# Patient Record
Sex: MALE | Race: WHITE
[De-identification: names, ages, dates, MRNs, and addresses within clinical notes are randomized per-mention and may not be internally consistent; named-entity substitution may affect disease eponyms.]

---

## 2024-04-02 ENCOUNTER — APPOINTMENT (OUTPATIENT)
Dept: ORTHOPEDIC SURGERY | Facility: CLINIC | Age: 54
End: 2024-04-02
Payer: MEDICARE

## 2024-04-02 VITALS — WEIGHT: 290 LBS | HEIGHT: 75 IN | BODY MASS INDEX: 36.06 KG/M2

## 2024-04-02 DIAGNOSIS — Z78.9 OTHER SPECIFIED HEALTH STATUS: ICD-10-CM

## 2024-04-02 DIAGNOSIS — M75.21 BICIPITAL TENDINITIS, RIGHT SHOULDER: ICD-10-CM

## 2024-04-02 DIAGNOSIS — E11.9 TYPE 2 DIABETES MELLITUS W/OUT COMPLICATIONS: ICD-10-CM

## 2024-04-02 PROCEDURE — 20611 DRAIN/INJ JOINT/BURSA W/US: CPT | Mod: RT

## 2024-04-02 PROCEDURE — 76882 US LMTD JT/FCL EVL NVASC XTR: CPT | Mod: 59

## 2024-04-02 PROCEDURE — 73030 X-RAY EXAM OF SHOULDER: CPT | Mod: RT

## 2024-04-02 PROCEDURE — 99204 OFFICE O/P NEW MOD 45 MIN: CPT | Mod: 25

## 2024-04-02 PROCEDURE — 73010 X-RAY EXAM OF SHOULDER BLADE: CPT | Mod: RT

## 2024-04-02 PROCEDURE — J3490M: CUSTOM

## 2024-04-02 RX ORDER — ATORVASTATIN CALCIUM 80 MG/1
TABLET, FILM COATED ORAL
Refills: 0 | Status: ACTIVE | COMMUNITY

## 2024-04-02 RX ORDER — LOSARTAN POTASSIUM 100 MG/1
TABLET, FILM COATED ORAL
Refills: 0 | Status: ACTIVE | COMMUNITY

## 2024-04-02 NOTE — HISTORY OF PRESENT ILLNESS
[Sharp] : sharp [Constant] : constant [Retired] : Work status: retired [de-identified] : 4/2/24: 52yo male presenting with RT shoulder pain for one month. Believes it may be from working out lightly each day. Experiencing constant sharp pain. No prior treatment.   PMH: HTN, DM, HLD  [] : no [FreeTextEntry1] : RT shoulder  [FreeTextEntry5] : 54yo male presenting with RT shoulder pain for one month. Believes it may be from working out lightly each day. Experiencing constant sharp pain. No prior treatment.

## 2024-04-02 NOTE — PROCEDURE
[FreeTextEntry3] :  Large joint injection was performed of the right shoulder. An injection of Lidocaine 3cc of 1% , Bupivacaine (Marcaine) 6cc of 0.5% , Triamcinolone (Kenalog) 2cc of 40 mg was used. Patient was advised to call if redness, pain or fever occur and apply ice for 15 minutes out of every hour for the next 12-24 hours as tolerated.  Patient has tried OTC's including aspirin, Ibuprofen, Aleve, etc or prescription NSAIDS, and/or exercises at home and/or physical therapy without satisfactory response, patient had decreased mobility in the joint and the risks benefits, and alternatives have been discussed, and verbal consent was obtained.  The site was prepped with alcohol, betadine and ethyl chloride sprayed topically  The risks, benefits and contents of the injection have been discussed. Risks include but are not limited to allergic reaction, flare reaction, permanent white skin discoloration at the injection site and infection. The patient understands the risks and agrees to having the injection. All questions have been answered.  Ultrasound guidance was indicated for this patient due to prior failure or difficult injection. All ultrasound images have been permanently captured and stored accordingly in our picture archiving and communication system. Visualization of the needle and placement of injection was performed without complication. An ultrasound of the extremity was indicated due to evaluate tendon for tears, tendonitis, soft tissue mass, or ganglion cyst. The findings showed no evidence of ligament, tendon or muscle tear.

## 2024-04-02 NOTE — IMAGING
[Right] : right shoulder [Type 2 acromion] : Type 2 acromion [There are no fractures, subluxations or dislocations. No significant abnormalities are seen] : There are no fractures, subluxations or dislocations. No significant abnormalities are seen

## 2024-04-02 NOTE — DISCUSSION/SUMMARY
[de-identified] : 52yo M with right biceps tendonitis  1) CSI right shoulder - tolerated well  2) start PT  3) rtc 6 weeks, if no improvement, will obtain MRI

## 2024-04-02 NOTE — PHYSICAL EXAM
[Right] : right shoulder [Sitting] : sitting [Mild] : mild [5 ___] : forward flexion 5[unfilled]/5 [5___] : internal rotation 5[unfilled]/5 [] : no ecchymosis [TWNoteComboBox7] : active forward flexion 160 degrees [TWNoteComboBox6] : internal rotation 60 degrees [de-identified] : external rotation 80 degrees

## 2024-05-14 ENCOUNTER — APPOINTMENT (OUTPATIENT)
Dept: ORTHOPEDIC SURGERY | Facility: CLINIC | Age: 54
End: 2024-05-14
Payer: MEDICARE

## 2024-05-14 VITALS — HEIGHT: 75 IN | BODY MASS INDEX: 35.43 KG/M2 | WEIGHT: 285 LBS

## 2024-05-14 VITALS — BODY MASS INDEX: 36.06 KG/M2 | HEIGHT: 75 IN | WEIGHT: 290 LBS

## 2024-05-14 PROCEDURE — 73564 X-RAY EXAM KNEE 4 OR MORE: CPT | Mod: RT

## 2024-05-14 PROCEDURE — J3490M: CUSTOM

## 2024-05-14 PROCEDURE — 20611 DRAIN/INJ JOINT/BURSA W/US: CPT

## 2024-05-14 PROCEDURE — 99214 OFFICE O/P EST MOD 30 MIN: CPT | Mod: 25

## 2024-05-14 NOTE — HISTORY OF PRESENT ILLNESS
[Sharp] : sharp [Constant] : constant [Retired] : Work status: retired [de-identified] : 5/14/24: PT here for R knee pain.  States had meniscectomy in 2007. States did round of synvisc in 2010 with mild relief.  Reports constant pain in knee with all activities, no edema, and limited ROM.  4/2/24: 52yo male presenting with RT shoulder pain for one month. Believes it may be from working out lightly each day. Experiencing constant sharp pain. No prior treatment.   PMH: HTN, DM, HLD  [] : no [FreeTextEntry1] : RT shoulder  [FreeTextEntry5] : CSI right shoulder last visit. has started PT.

## 2024-05-14 NOTE — PHYSICAL EXAM
[Right] : right knee [NL (0)] : extension 0 degrees [5___] : hamstring 5[unfilled]/5 [Negative] : negative Delphine's [] : no pain with varus stress [TWNoteComboBox7] : flexion 130 degrees

## 2024-05-14 NOTE — DISCUSSION/SUMMARY
[de-identified] : 52yo M with right knee OA. hx of right knee LMT in 2007.  1) CSI right knee today - tolerated well  2) start PT  3) discussed availability of visco injections and pt would like to proceed. will request auth for a series of injections. 4) rtc once auth is approved for injections

## 2024-05-14 NOTE — IMAGING
[Type 2 acromion] : Type 2 acromion [There are no fractures, subluxations or dislocations. No significant abnormalities are seen] : There are no fractures, subluxations or dislocations. No significant abnormalities are seen [Right] : right knee [All Views] : anteroposterior, lateral, skyline, and anteroposterior standing [Mild tricompartmental OA lateral narrowing] : Mild tricompartmental OA lateral narrowing [Moderate tricompartmental OA medial narrowing] : Moderate tricompartmental OA medial narrowing [Mild patellofemoral OA] : Mild patellofemoral OA

## 2024-05-16 RX ORDER — HYALURONATE SODIUM 20 MG/2 ML
20 SYRINGE (ML) INTRAARTICULAR
Qty: 3 | Refills: 0 | Status: ACTIVE | COMMUNITY
Start: 2024-05-16 | End: 1900-01-01

## 2024-05-30 ENCOUNTER — APPOINTMENT (OUTPATIENT)
Dept: ORTHOPEDIC SURGERY | Facility: CLINIC | Age: 54
End: 2024-05-30

## 2024-06-06 ENCOUNTER — APPOINTMENT (OUTPATIENT)
Dept: ORTHOPEDIC SURGERY | Facility: CLINIC | Age: 54
End: 2024-06-06
Payer: MEDICARE

## 2024-06-06 VITALS — WEIGHT: 285 LBS | BODY MASS INDEX: 35.43 KG/M2 | HEIGHT: 75 IN

## 2024-06-06 PROCEDURE — 20611 DRAIN/INJ JOINT/BURSA W/US: CPT | Mod: RT

## 2024-06-06 PROCEDURE — 99213 OFFICE O/P EST LOW 20 MIN: CPT | Mod: 25

## 2024-06-06 NOTE — HISTORY OF PRESENT ILLNESS
[Sharp] : sharp [Constant] : constant [Retired] : Work status: retired [de-identified] : 6/6/24: here to f/up R knee, reports continued R knee pain.   5/14/24: PT here for R knee pain.  States had meniscectomy in 2007. States did round of synvisc in 2010 with mild relief.  Reports constant pain in knee with all activities, no edema, and limited ROM.  4/2/24: 52yo male presenting with RT shoulder pain for one month. Believes it may be from working out lightly each day. Experiencing constant sharp pain. No prior treatment.   PMH: HTN, DM, HLD  [] : no [FreeTextEntry1] : RT knee  [FreeTextEntry5] : Euflexxa #1 RT knee.

## 2024-06-06 NOTE — IMAGING
[Right] : right knee [All Views] : anteroposterior, lateral, skyline, and anteroposterior standing [Mild tricompartmental OA lateral narrowing] : Mild tricompartmental OA lateral narrowing [Mild patellofemoral OA] : Mild patellofemoral OA [Moderate tricompartmental OA medial narrowing] : Moderate tricompartmental OA medial narrowing

## 2024-06-06 NOTE — DISCUSSION/SUMMARY
[de-identified] : 53m with R knee djd. discussed availability of visco injections and pt would like to proceed. Euflexxa #1 Injection tolerated well. Post injection instructions reviewed. 1) wbat, cryotherapy 2) rtc 1 week  Entered by Ewa Jenkins acting as scribe. Dr. Carballo- The documentation recorded by the scribe accurately reflects the service I personally performed and the decisions made by me.

## 2024-06-06 NOTE — PROCEDURE
[FreeTextEntry3] : Large joint injection was performed  of the right knee. The indication for this procedure was x-ray evidence of Osteoarthritis on this or prior visit. The site was prepped with alcohol and betadine. An injection of Lidocaine 3cc of 1% , Euflexxa 20mg, # 1 was used.   Patient was advised to call if redness, pain or fever occur and apply ice for 15 minutes out of every hour for the next 12-24 hours as tolerated.   Patient has tried OTC's including aspirin, Ibuprofen, Aleve, etc or prescription NSAIDS, and/or exercises at home and/or physical therapy without satisfactory response, patient had decreased mobility in the joint and the risks benefits, and alternatives have been discussed, and verbal consent was obtained.   The risks, benefits and contents of the injection have been discussed.  Risks include but are not limited to allergic reaction, flare reaction, permanent white skin discoloration at the injection site and infection.  The patient understands the risks and agrees to having the injection.  All questions have been answered.   Ultrasound guidance was indicated for this patient due to prior failure or difficult injection.

## 2024-06-13 ENCOUNTER — APPOINTMENT (OUTPATIENT)
Dept: ORTHOPEDIC SURGERY | Facility: CLINIC | Age: 54
End: 2024-06-13
Payer: MEDICARE

## 2024-06-13 PROCEDURE — 20611 DRAIN/INJ JOINT/BURSA W/US: CPT | Mod: RT

## 2024-06-13 NOTE — DISCUSSION/SUMMARY
[de-identified] : 53m with R knee djd. discussed availability of visco injections and pt would like to proceed. Euflexxa #2 Injection tolerated well. Post injection instructions reviewed. 1) wbat, cryotherapy 2) rtc 1 week  Entered by Ewa Jenkins acting as scribe. Dr. Carballo- The documentation recorded by the scribe accurately reflects the service I personally performed and the decisions made by me.

## 2024-06-13 NOTE — HISTORY OF PRESENT ILLNESS
[Sharp] : sharp [Constant] : constant [Retired] : Work status: retired [de-identified] : 6/13/24: here to f/up R knee and euflexxa #2 6/6/24: here to f/up R knee, reports continued R knee pain.   5/14/24: PT here for R knee pain.  States had meniscectomy in 2007. States did round of synvisc in 2010 with mild relief.  Reports constant pain in knee with all activities, no edema, and limited ROM.  4/2/24: 52yo male presenting with RT shoulder pain for one month. Believes it may be from working out lightly each day. Experiencing constant sharp pain. No prior treatment.   PMH: HTN, DM, HLD  [] : no [FreeTextEntry1] : RT knee  [FreeTextEntry5] : Euflexxa #1 RT knee.

## 2024-06-13 NOTE — PROCEDURE
[FreeTextEntry3] : Large joint injection was performed  of the right knee. The indication for this procedure was x-ray evidence of Osteoarthritis on this or prior visit. The site was prepped with alcohol and betadine. An injection of Lidocaine 3cc of 1% , Euflexxa 20mg, # 2 was used.   Patient was advised to call if redness, pain or fever occur and apply ice for 15 minutes out of every hour for the next 12-24 hours as tolerated.   Patient has tried OTC's including aspirin, Ibuprofen, Aleve, etc or prescription NSAIDS, and/or exercises at home and/or physical therapy without satisfactory response, patient had decreased mobility in the joint and the risks benefits, and alternatives have been discussed, and verbal consent was obtained.   The risks, benefits and contents of the injection have been discussed.  Risks include but are not limited to allergic reaction, flare reaction, permanent white skin discoloration at the injection site and infection.  The patient understands the risks and agrees to having the injection.  All questions have been answered.   Ultrasound guidance was indicated for this patient due to prior failure or difficult injection.  04-May-2018 14:57

## 2024-06-20 ENCOUNTER — APPOINTMENT (OUTPATIENT)
Dept: ORTHOPEDIC SURGERY | Facility: CLINIC | Age: 54
End: 2024-06-20
Payer: MEDICARE

## 2024-06-20 DIAGNOSIS — M17.11 UNILATERAL PRIMARY OSTEOARTHRITIS, RIGHT KNEE: ICD-10-CM

## 2024-06-20 PROCEDURE — 20611 DRAIN/INJ JOINT/BURSA W/US: CPT | Mod: RT

## 2024-06-20 NOTE — HISTORY OF PRESENT ILLNESS
[Sharp] : sharp [Constant] : constant [Retired] : Work status: retired [de-identified] : 6/20/24:  here to f/u R knee and euflexxa #3 6/13/24: here to f/up R knee and euflexxa #2 6/6/24: here to f/up R knee, reports continued R knee pain.   5/14/24: PT here for R knee pain.  States had meniscectomy in 2007. States did round of synvisc in 2010 with mild relief.  Reports constant pain in knee with all activities, no edema, and limited ROM.  4/2/24: 52yo male presenting with RT shoulder pain for one month. Believes it may be from working out lightly each day. Experiencing constant sharp pain. No prior treatment.   PMH: HTN, DM, HLD  [] : no [FreeTextEntry1] : RT knee  [FreeTextEntry5] : Euflexxa #1 RT knee.

## 2024-06-20 NOTE — PROCEDURE
[FreeTextEntry3] : Large joint injection was performed  of the right knee. The indication for this procedure was x-ray evidence of Osteoarthritis on this or prior visit. The site was prepped with alcohol and betadine. An injection of Lidocaine 3cc of 1% , Euflexxa 20mg, # 3 was used.   Patient was advised to call if redness, pain or fever occur and apply ice for 15 minutes out of every hour for the next 12-24 hours as tolerated.   Patient has tried OTC's including aspirin, Ibuprofen, Aleve, etc or prescription NSAIDS, and/or exercises at home and/or physical therapy without satisfactory response, patient had decreased mobility in the joint and the risks benefits, and alternatives have been discussed, and verbal consent was obtained.   The risks, benefits and contents of the injection have been discussed.  Risks include but are not limited to allergic reaction, flare reaction, permanent white skin discoloration at the injection site and infection.  The patient understands the risks and agrees to having the injection.  All questions have been answered.   Ultrasound guidance was indicated for this patient due to prior failure or difficult injection.

## 2024-06-20 NOTE — DISCUSSION/SUMMARY
[de-identified] : 53m with R knee djd. discussed availability of visco injections and pt would like to proceed. Euflexxa #3 Injection tolerated well. Post injection instructions reviewed. 1) wbat, cryotherapy 2) rtc  6 weeks / prn   Entered by Ewa Jenkins acting as scribe. Dr. Carballo- The documentation recorded by the scribe accurately reflects the service I personally performed and the decisions made by me.

## 2024-08-01 ENCOUNTER — APPOINTMENT (OUTPATIENT)
Dept: ORTHOPEDIC SURGERY | Facility: CLINIC | Age: 54
End: 2024-08-01
Payer: MEDICARE

## 2024-08-01 VITALS — HEIGHT: 75 IN | WEIGHT: 285 LBS | BODY MASS INDEX: 35.43 KG/M2

## 2024-08-01 DIAGNOSIS — M75.21 BICIPITAL TENDINITIS, RIGHT SHOULDER: ICD-10-CM

## 2024-08-01 PROCEDURE — 99213 OFFICE O/P EST LOW 20 MIN: CPT

## 2024-08-01 NOTE — DISCUSSION/SUMMARY
[de-identified] : 54m with right shoulder biceps tendinitis. Persistent pain, has failed conservative treatment including PT, medication and csi.  1) MRI right shoulder, eval biceps / rotator cuff 2) cryotherapy, rest and activity modification  3) c/w PT and hep  4) rtc after MRI   Entered by Ewa Jenkins acting as scribe. Dr. Carballo- The documentation recorded by the scribe accurately reflects the service I personally performed and the decisions made by me.

## 2024-08-01 NOTE — PHYSICAL EXAM
[NL (0)] : extension 0 degrees [5___] : hamstring 5[unfilled]/5 [Negative] : negative Delphine's [Right] : right shoulder [de-identified] : active abduction 160 degrees [de-identified] : external rotation at 90 degrees of abduction 80 degrees [] : no pain with varus stress [TWNoteComboBox7] : flexion 130 degrees

## 2024-08-01 NOTE — HISTORY OF PRESENT ILLNESS
[Sharp] : sharp [Constant] : constant [Retired] : Work status: retired [de-identified] : 8/1/2024: Pt here to f/u R shoulder pain. States attending PT 3x/week, where they are working on Knee/shoulder and ankle. States R shoulder pain can be located over the anterior aspect. States R knee stiffens up occasionally with posterior pain, but works it way out in PT.  6/20/24:  here to f/u R knee and euflexxa #3 6/13/24: here to f/up R knee and euflexxa #2 6/6/24: here to f/up R knee, reports continued R knee pain.   5/14/24: PT here for R knee pain.  States had meniscectomy in 2007. States did round of synvisc in 2010 with mild relief.  Reports constant pain in knee with all activities, no edema, and limited ROM.  4/2/24: 54yo male presenting with RT shoulder pain for one month. Believes it may be from working out lightly each day. Experiencing constant sharp pain. No prior treatment.   PMH: HTN, DM, HLD  [] : no [FreeTextEntry1] : RT knee  [FreeTextEntry5] : Euflexxa #1 RT knee.

## 2024-08-29 ENCOUNTER — APPOINTMENT (OUTPATIENT)
Dept: ORTHOPEDIC SURGERY | Facility: CLINIC | Age: 54
End: 2024-08-29
Payer: MEDICARE

## 2024-08-29 VITALS — BODY MASS INDEX: 35.43 KG/M2 | WEIGHT: 285 LBS | HEIGHT: 75 IN

## 2024-08-29 DIAGNOSIS — M75.21 BICIPITAL TENDINITIS, RIGHT SHOULDER: ICD-10-CM

## 2024-08-29 PROCEDURE — 99213 OFFICE O/P EST LOW 20 MIN: CPT

## 2024-08-29 NOTE — DISCUSSION/SUMMARY
[de-identified] : 53m with right shoulder biceps tendinitis. Persistent pain, has failed conservative treatment including PT, medication and csi.  Pt has reported that he has done PT with a physical therapist over the past 6 weeks. 1) MRI right shoulder, eval biceps / rotator cuff 2) cryotherapy, rest and activity modification  3) rtc after MRI   Entered by Ewa Jenkins acting as scribe. Dr. Carballo- The documentation recorded by the scribe accurately reflects the service I personally performed and the decisions made by me. 
yes

## 2024-08-29 NOTE — PHYSICAL EXAM
[Right] : right knee [NL (0)] : extension 0 degrees [5___] : hamstring 5[unfilled]/5 [Negative] : negative Delphine's [4 ___] : forward flexion 4[unfilled]/5 [4___] : internal rotation 4[unfilled]/5 [de-identified] : active abduction 160 degrees [de-identified] : external rotation at 90 degrees of abduction 80 degrees [] : no pain with varus stress [TWNoteComboBox7] : flexion 130 degrees

## 2024-08-29 NOTE — HISTORY OF PRESENT ILLNESS
[Sharp] : sharp [Constant] : constant [Retired] : Work status: retired [de-identified] : 8/1/2024: Pt here to f/u R shoulder pain. States attending PT 3x/week, where they are working on Knee/shoulder and ankle. States R shoulder pain can be located over the anterior aspect. States R knee stiffens up occasionally with posterior pain, but works it way out in PT.  6/20/24:  here to f/u R knee and euflexxa #3 6/13/24: here to f/up R knee and euflexxa #2 6/6/24: here to f/up R knee, reports continued R knee pain.   5/14/24: PT here for R knee pain.  States had meniscectomy in 2007. States did round of synvisc in 2010 with mild relief.  Reports constant pain in knee with all activities, no edema, and limited ROM.  4/2/24: 52yo male presenting with RT shoulder pain for one month. Believes it may be from working out lightly each day. Experiencing constant sharp pain. No prior treatment.   PMH: HTN, DM, HLD  [] : no [FreeTextEntry1] : RT knee, Rt shoulder  [FreeTextEntry5] : MRI RT shoulder denied.

## 2024-09-24 ENCOUNTER — APPOINTMENT (OUTPATIENT)
Dept: ORTHOPEDIC SURGERY | Facility: CLINIC | Age: 54
End: 2024-09-24
Payer: MEDICARE

## 2024-09-24 VITALS — WEIGHT: 285 LBS | BODY MASS INDEX: 35.43 KG/M2 | HEIGHT: 75 IN

## 2024-09-24 DIAGNOSIS — M75.21 BICIPITAL TENDINITIS, RIGHT SHOULDER: ICD-10-CM

## 2024-09-24 PROCEDURE — 99213 OFFICE O/P EST LOW 20 MIN: CPT

## 2024-09-24 NOTE — PHYSICAL EXAM
[4 ___] : forward flexion 4[unfilled]/5 [4___] : internal rotation 4[unfilled]/5 [Right] : right knee [NL (0)] : extension 0 degrees [5___] : hamstring 5[unfilled]/5 [Negative] : negative Delphine's [de-identified] : active abduction 160 degrees [de-identified] : external rotation at 90 degrees of abduction 80 degrees [] : no pain with valgus stress [TWNoteComboBox7] : flexion 130 degrees

## 2024-09-24 NOTE — HISTORY OF PRESENT ILLNESS
[Sharp] : sharp [Constant] : constant [Retired] : Work status: retired [de-identified] : 9/24/24: Pt here to f/u R shoulder pain. States feeling worse, and unable to lift arm. Attending PT 3x/week, and would like an authorization letter for insurance to approve MRI.  8/1/2024: Pt here to f/u R shoulder pain. States attending PT 3x/week, where they are working on Knee/shoulder and ankle. States R shoulder pain can be located over the anterior aspect. States R knee stiffens up occasionally with posterior pain, but works it way out in PT.  6/20/24:  here to f/u R knee and euflexxa #3 6/13/24: here to f/up R knee and euflexxa #2 6/6/24: here to f/up R knee, reports continued R knee pain.   5/14/24: PT here for R knee pain.  States had meniscectomy in 2007. States did round of synvisc in 2010 with mild relief.  Reports constant pain in knee with all activities, no edema, and limited ROM.  4/2/24: 54yo male presenting with RT shoulder pain for one month. Believes it may be from working out lightly each day. Experiencing constant sharp pain. No prior treatment.   PMH: HTN, DM, HLD  [] : no [FreeTextEntry5] : MRI RT shoulder denied.  [FreeTextEntry1] : RT knee, Rt shoulder

## 2024-09-24 NOTE — DISCUSSION/SUMMARY
[de-identified] : 53m with right shoulder biceps tendinitis. Weakness on exam today. Concern for rotator cuff tear. Persistent pain, has failed conservative treatment including 6 weeks of PT, medication and CSI.  1) MRI right shoulder, eval biceps / rotator cuff - medically necessary at this time to evaluate for persistent pain and weakness on exam  2) cryotherapy, rest and activity modification  3) rtc after MRI   Entered by Ewa Jenkins acting as scribe. Dr. Carballo- The documentation recorded by the scribe accurately reflects the service I personally performed and the decisions made by me.

## 2024-10-01 ENCOUNTER — APPOINTMENT (OUTPATIENT)
Dept: ORTHOPEDIC SURGERY | Facility: CLINIC | Age: 54
End: 2024-10-01
Payer: MEDICARE

## 2024-10-01 VITALS — BODY MASS INDEX: 35.43 KG/M2 | WEIGHT: 285 LBS | HEIGHT: 75 IN

## 2024-10-01 DIAGNOSIS — M19.011 PRIMARY OSTEOARTHRITIS, RIGHT SHOULDER: ICD-10-CM

## 2024-10-01 DIAGNOSIS — M75.81 OTHER SHOULDER LESIONS, RIGHT SHOULDER: ICD-10-CM

## 2024-10-01 PROCEDURE — J3490M: CUSTOM

## 2024-10-01 PROCEDURE — 99214 OFFICE O/P EST MOD 30 MIN: CPT | Mod: 25

## 2024-10-01 PROCEDURE — 20611 DRAIN/INJ JOINT/BURSA W/US: CPT | Mod: RT

## 2024-10-01 NOTE — DISCUSSION/SUMMARY
[de-identified] : 53m with MRI right shoulder with rotator cuff tendinitis, ac joint djd and degenerative labral tearing. r/b/a of surgical intervention and conservative management discussed. pt given to opportunity to ask all questions and all questions were answered. Will continue with a course of conservative treatment at this time. Discussed possible future ac joint resection.  1) csi right shoulder - tolerated well 2) cryotherapy, rest and activity modification  3) start physical therapy 4) rtc 8 weeks   Entered by Ewa Jenkins acting as scribe. Dr. Carballo- The documentation recorded by the scribe accurately reflects the service I personally performed and the decisions made by me.

## 2024-10-01 NOTE — DATA REVIEWED
[MRI] : MRI [Right] : of the right [Shoulder] : shoulder [Report was reviewed and noted in the chart] : The report was reviewed and noted in the chart [I independently reviewed and interpreted images and report] : I independently reviewed and interpreted images and report [I reviewed the films/CD] : I reviewed the films/CD [FreeTextEntry1] : 09.30.24 ([facility]):  1.  Mild supraspinatus tendinosis without tear 2.  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  Chondral fissuring and subchondral cystlike change posterior glenoid rim with more limited subchondral cystlike change anterior inferior glenoid rim 4.  Acromioclavicular arthrosis

## 2024-10-01 NOTE — PHYSICAL EXAM
[Right] : right shoulder [4 ___] : forward flexion 4[unfilled]/5 [4___] : internal rotation 4[unfilled]/5 [] : negative [place] [TWNoteComboBox7] : active forward flexion 160 degrees [de-identified] : active abduction 160 degrees [de-identified] : external rotation at 90 degrees of abduction 80 degrees

## 2024-10-01 NOTE — HISTORY OF PRESENT ILLNESS
[Sharp] : sharp [Constant] : constant [Retired] : Work status: retired [de-identified] : 10/1/24: Here to f/up right shoulder and review MRI results.  9/24/24: Pt here to f/u R shoulder pain. States feeling worse, and unable to lift arm. Attending PT 3x/week, and would like an authorization letter for insurance to approve MRI. 8/1/2024: Pt here to f/u R shoulder pain. States attending PT 3x/week, where they are working on Knee/shoulder and ankle. States R shoulder pain can be located over the anterior aspect. States R knee stiffens up occasionally with posterior pain, but works it way out in PT. 6/20/24:  here to f/u R knee and euflexxa #3 6/13/24: here to f/up R knee and euflexxa #2 6/6/24: here to f/up R knee, reports continued R knee pain.   5/14/24: PT here for R knee pain.  States had meniscectomy in 2007. States did round of synvisc in 2010 with mild relief.  Reports constant pain in knee with all activities, no edema, and limited ROM.  4/2/24: 54yo male presenting with RT shoulder pain for one month. Believes it may be from working out lightly each day. Experiencing constant sharp pain. No prior treatment.   PMH: HTN, DM, HLD  [] : no [FreeTextEntry1] : RT knee, Rt shoulder  [FreeTextEntry5] : MRI RT shoulder review today, [facility].

## 2024-12-10 ENCOUNTER — APPOINTMENT (OUTPATIENT)
Dept: ORTHOPEDIC SURGERY | Facility: CLINIC | Age: 54
End: 2024-12-10
Payer: MEDICARE

## 2024-12-10 VITALS — HEIGHT: 75 IN | WEIGHT: 285 LBS | BODY MASS INDEX: 35.43 KG/M2

## 2024-12-10 DIAGNOSIS — M75.21 BICIPITAL TENDINITIS, RIGHT SHOULDER: ICD-10-CM

## 2024-12-10 DIAGNOSIS — M75.81 OTHER SHOULDER LESIONS, RIGHT SHOULDER: ICD-10-CM

## 2024-12-10 DIAGNOSIS — M19.011 PRIMARY OSTEOARTHRITIS, RIGHT SHOULDER: ICD-10-CM

## 2024-12-10 PROCEDURE — 99213 OFFICE O/P EST LOW 20 MIN: CPT

## 2024-12-11 RX ORDER — HYALURONATE SODIUM 20 MG/2 ML
20 SYRINGE (ML) INTRAARTICULAR
Qty: 3 | Refills: 0 | Status: ACTIVE | COMMUNITY
Start: 2024-12-11 | End: 1900-01-01

## 2025-01-07 ENCOUNTER — APPOINTMENT (OUTPATIENT)
Dept: ORTHOPEDIC SURGERY | Facility: CLINIC | Age: 55
End: 2025-01-07
Payer: MEDICARE

## 2025-01-07 PROCEDURE — 99213 OFFICE O/P EST LOW 20 MIN: CPT

## 2025-01-14 ENCOUNTER — APPOINTMENT (OUTPATIENT)
Dept: ORTHOPEDIC SURGERY | Facility: CLINIC | Age: 55
End: 2025-01-14
Payer: MEDICARE

## 2025-01-14 VITALS — WEIGHT: 285 LBS | BODY MASS INDEX: 35.43 KG/M2 | HEIGHT: 75 IN

## 2025-01-14 PROCEDURE — 20611 DRAIN/INJ JOINT/BURSA W/US: CPT | Mod: RT

## 2025-01-21 ENCOUNTER — APPOINTMENT (OUTPATIENT)
Dept: ORTHOPEDIC SURGERY | Facility: CLINIC | Age: 55
End: 2025-01-21
Payer: MEDICARE

## 2025-01-21 VITALS — HEIGHT: 75 IN | WEIGHT: 285 LBS | BODY MASS INDEX: 35.43 KG/M2

## 2025-01-21 DIAGNOSIS — M17.11 UNILATERAL PRIMARY OSTEOARTHRITIS, RIGHT KNEE: ICD-10-CM

## 2025-01-21 PROCEDURE — 20611 DRAIN/INJ JOINT/BURSA W/US: CPT | Mod: RT
